# Patient Record
Sex: MALE | Race: WHITE
[De-identification: names, ages, dates, MRNs, and addresses within clinical notes are randomized per-mention and may not be internally consistent; named-entity substitution may affect disease eponyms.]

---

## 2018-12-07 ENCOUNTER — HOSPITAL ENCOUNTER (EMERGENCY)
Dept: HOSPITAL 92 - ERS | Age: 38
Discharge: HOME | End: 2018-12-07
Payer: SELF-PAY

## 2018-12-07 DIAGNOSIS — K21.9: ICD-10-CM

## 2018-12-07 DIAGNOSIS — J02.9: Primary | ICD-10-CM

## 2018-12-07 PROCEDURE — 87430 STREP A AG IA: CPT

## 2018-12-07 PROCEDURE — 71046 X-RAY EXAM CHEST 2 VIEWS: CPT

## 2018-12-07 PROCEDURE — 87081 CULTURE SCREEN ONLY: CPT

## 2018-12-07 NOTE — RAD
TWO VIEW CHEST:

 

INDICATION: 

Pharyngitis, cough, pain.

 

FINDINGS: 

There is no evidence of lobar consolidation, effusion, or pneumothorax.  The cardiac silhouette is no
rmal in size.  Osseous structures are intact.

 

IMPRESSION: 

No focal consolidation.

 

POS: [facility]

## 2019-01-12 ENCOUNTER — HOSPITAL ENCOUNTER (EMERGENCY)
Dept: HOSPITAL 9 - MADERS | Age: 39
Discharge: HOME | End: 2019-01-12
Payer: SELF-PAY

## 2019-01-12 DIAGNOSIS — J06.9: Primary | ICD-10-CM

## 2019-01-12 DIAGNOSIS — J98.01: ICD-10-CM

## 2019-01-12 LAB — SP GR UR STRIP: 1.01 (ref 1–1.03)

## 2019-01-12 PROCEDURE — 71046 X-RAY EXAM CHEST 2 VIEWS: CPT

## 2019-01-12 PROCEDURE — 87804 INFLUENZA ASSAY W/OPTIC: CPT

## 2019-01-12 PROCEDURE — 81003 URINALYSIS AUTO W/O SCOPE: CPT

## 2019-01-12 NOTE — RAD
EXAM:

CHEST TWO VIEWS:

1/12/19

 

HISTORY: 

Cough.

 

COMPARISON:  

12/7/18.

 

FINDINGS:  

Heart size is normal. The lungs are clear. No pneumonia, edema, or pleural effusion or other acute pr
ocess.

 

IMPRESSION:  

No acute intrathoracic disease. Stable from prior study. No evidence of pneumonia. 

 

POS: [facility]

## 2019-10-04 ENCOUNTER — HOSPITAL ENCOUNTER (EMERGENCY)
Dept: HOSPITAL 9 - MADERS | Age: 39
Discharge: HOME | End: 2019-10-04
Payer: SELF-PAY

## 2019-10-04 DIAGNOSIS — S61.217A: Primary | ICD-10-CM

## 2019-10-04 DIAGNOSIS — F41.9: ICD-10-CM

## 2019-10-04 DIAGNOSIS — T15.02XA: ICD-10-CM
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
## 2019-10-04 DIAGNOSIS — K21.9: ICD-10-CM

## 2019-10-04 DIAGNOSIS — F17.210: ICD-10-CM

## 2019-10-04 DIAGNOSIS — Z23: ICD-10-CM

## 2019-10-04 DIAGNOSIS — Z79.899: ICD-10-CM

## 2019-10-04 PROCEDURE — 90715 TDAP VACCINE 7 YRS/> IM: CPT

## 2019-10-04 PROCEDURE — 90471 IMMUNIZATION ADMIN: CPT

## 2019-10-04 PROCEDURE — 96372 THER/PROPH/DIAG INJ SC/IM: CPT

## 2019-10-17 ENCOUNTER — HOSPITAL ENCOUNTER (EMERGENCY)
Dept: HOSPITAL 9 - MADERS | Age: 39
Discharge: HOME | End: 2019-10-17
Payer: SELF-PAY

## 2019-10-17 DIAGNOSIS — J18.9: Primary | ICD-10-CM

## 2019-10-17 DIAGNOSIS — F41.9: ICD-10-CM

## 2019-10-17 DIAGNOSIS — K21.9: ICD-10-CM

## 2019-10-17 DIAGNOSIS — F17.210: ICD-10-CM

## 2019-10-17 PROCEDURE — 87804 INFLUENZA ASSAY W/OPTIC: CPT

## 2019-10-17 PROCEDURE — 71046 X-RAY EXAM CHEST 2 VIEWS: CPT

## 2019-10-17 NOTE — RAD
CHEST TWO VIEWS:

 

HISTORY:

Cough.

 

COMPARISON:

01/12/2019

 

FINDINGS:

The lung fields are clear. The heart and mediastinum appear normal. The osseous structures are normal
.

 

IMPRESSION:

Negative chest.

 

POS: [facility]

## 2019-10-29 ENCOUNTER — HOSPITAL ENCOUNTER (EMERGENCY)
Dept: HOSPITAL 9 - MADERS | Age: 39
Discharge: HOME | End: 2019-10-29
Payer: SELF-PAY

## 2019-10-29 DIAGNOSIS — K21.9: ICD-10-CM

## 2019-10-29 DIAGNOSIS — Z79.899: ICD-10-CM

## 2019-10-29 DIAGNOSIS — F41.9: ICD-10-CM

## 2019-10-29 DIAGNOSIS — J18.9: Primary | ICD-10-CM

## 2019-10-29 DIAGNOSIS — F17.210: ICD-10-CM

## 2019-10-29 PROCEDURE — 87430 STREP A AG IA: CPT

## 2019-10-29 PROCEDURE — 87804 INFLUENZA ASSAY W/OPTIC: CPT

## 2019-10-29 PROCEDURE — 87081 CULTURE SCREEN ONLY: CPT

## 2019-10-29 PROCEDURE — 71046 X-RAY EXAM CHEST 2 VIEWS: CPT

## 2019-10-29 NOTE — RAD
XR Chest Pa   Lat STANDARD



History: Fever cough and chills



Comparison: Radiograph October 17, 2019



Findings: Lungs are clear. No pneumothorax or effusion. Cardiac silhouette and mediastinal contours a
re within normal limits. No acute osseous abnormality.



Impression: No acute intrathoracic abnormality.



Reported By: Pedro Logan 

Electronically Signed:  10/29/2019 12:51 PM

## 2021-01-14 ENCOUNTER — HOSPITAL ENCOUNTER (EMERGENCY)
Dept: HOSPITAL 9 - MADERS | Age: 41
Discharge: HOME | End: 2021-01-14
Payer: SELF-PAY

## 2021-01-14 DIAGNOSIS — K21.9: ICD-10-CM

## 2021-01-14 DIAGNOSIS — R10.9: ICD-10-CM

## 2021-01-14 DIAGNOSIS — F17.210: ICD-10-CM

## 2021-01-14 DIAGNOSIS — Z79.899: ICD-10-CM

## 2021-01-14 DIAGNOSIS — R51.9: ICD-10-CM

## 2021-01-14 DIAGNOSIS — R53.83: Primary | ICD-10-CM

## 2021-01-14 DIAGNOSIS — Z87.442: ICD-10-CM

## 2021-01-14 DIAGNOSIS — Z85.841: ICD-10-CM

## 2021-01-14 DIAGNOSIS — R07.9: ICD-10-CM

## 2021-01-14 DIAGNOSIS — Z20.822: ICD-10-CM

## 2021-01-14 LAB
ALBUMIN SERPL BCG-MCNC: 3.9 G/DL (ref 3.5–5)
ALP SERPL-CCNC: 42 U/L (ref 40–110)
ALT SERPL W P-5'-P-CCNC: 16 U/L (ref 8–55)
ANION GAP SERPL CALC-SCNC: 13 MMOL/L (ref 10–20)
AST SERPL-CCNC: 16 U/L (ref 5–34)
BASOPHILS # BLD AUTO: 0.1 THOU/UL (ref 0–0.2)
BASOPHILS NFR BLD AUTO: 1.2 % (ref 0–1)
BILIRUB SERPL-MCNC: 0.3 MG/DL (ref 0.2–1.2)
BUN SERPL-MCNC: 8 MG/DL (ref 8.9–20.6)
CALCIUM SERPL-MCNC: 8.9 MG/DL (ref 7.8–10.44)
CHLORIDE SERPL-SCNC: 103 MMOL/L (ref 98–107)
CO2 SERPL-SCNC: 28 MMOL/L (ref 22–29)
CREAT CL PREDICTED SERPL C-G-VRATE: 0 ML/MIN (ref 70–130)
EOSINOPHIL # BLD AUTO: 0.1 THOU/UL (ref 0–0.7)
EOSINOPHIL NFR BLD AUTO: 1.9 % (ref 0–10)
GLOBULIN SER CALC-MCNC: 2.5 G/DL (ref 2.4–3.5)
GLUCOSE SERPL-MCNC: 95 MG/DL (ref 70–105)
HGB BLD-MCNC: 13.3 G/DL (ref 14–18)
LYMPHOCYTES # BLD AUTO: 2 THOU/UL (ref 1.2–3.4)
LYMPHOCYTES NFR BLD AUTO: 27.5 % (ref 21–51)
MCH RBC QN AUTO: 24.9 PG (ref 27–31)
MCV RBC AUTO: 79.1 FL (ref 78–98)
MONOCYTES # BLD AUTO: 0.5 THOU/UL (ref 0.11–0.59)
MONOCYTES NFR BLD AUTO: 6.6 % (ref 0–10)
NEUTROPHILS # BLD AUTO: 4.5 THOU/UL (ref 1.4–6.5)
NEUTROPHILS NFR BLD AUTO: 62.9 % (ref 42–75)
PLATELET # BLD AUTO: 223 THOU/UL (ref 130–400)
POTASSIUM SERPL-SCNC: 4.8 MMOL/L (ref 3.5–5.1)
RBC # BLD AUTO: 5.35 MILL/UL (ref 4.7–6.1)
SODIUM SERPL-SCNC: 139 MMOL/L (ref 136–145)
WBC # BLD AUTO: 7.2 THOU/UL (ref 4.8–10.8)

## 2021-01-14 PROCEDURE — 71045 X-RAY EXAM CHEST 1 VIEW: CPT

## 2021-01-14 PROCEDURE — 36415 COLL VENOUS BLD VENIPUNCTURE: CPT

## 2021-01-14 PROCEDURE — 85025 COMPLETE CBC W/AUTO DIFF WBC: CPT

## 2021-01-14 PROCEDURE — 87635 SARS-COV-2 COVID-19 AMP PRB: CPT

## 2021-01-14 PROCEDURE — 84443 ASSAY THYROID STIM HORMONE: CPT

## 2021-01-14 PROCEDURE — 84484 ASSAY OF TROPONIN QUANT: CPT

## 2021-01-14 PROCEDURE — 93005 ELECTROCARDIOGRAM TRACING: CPT

## 2021-01-14 PROCEDURE — 86140 C-REACTIVE PROTEIN: CPT

## 2021-01-14 PROCEDURE — U0005 INFEC AGEN DETEC AMPLI PROBE: HCPCS

## 2021-01-14 PROCEDURE — 80053 COMPREHEN METABOLIC PANEL: CPT

## 2021-01-14 PROCEDURE — U0003 INFECTIOUS AGENT DETECTION BY NUCLEIC ACID (DNA OR RNA); SEVERE ACUTE RESPIRATORY SYNDROME CORONAVIRUS 2 (SARS-COV-2) (CORONAVIRUS DISEASE [COVID-19]), AMPLIFIED PROBE TECHNIQUE, MAKING USE OF HIGH THROUGHPUT TECHNOLOGIES AS DESCRIBED BY CMS-2020-01-R: HCPCS

## 2021-01-14 NOTE — RAD
Portable frontal chest radiograph:

1/14/2021



COMPARISON: 10/29/2019



HISTORY: Fever, cough, chills



FINDINGS: Lungs are clear. Heart and mediastinal contours appear within normal limits.



IMPRESSION: No acute findings.



Reported By: Paul Curry 

Electronically Signed:  1/14/2021 5:20 PM

## 2021-02-06 ENCOUNTER — HOSPITAL ENCOUNTER (EMERGENCY)
Dept: HOSPITAL 9 - MADERS | Age: 41
Discharge: HOME | End: 2021-02-06
Payer: COMMERCIAL

## 2021-02-06 DIAGNOSIS — M79.10: ICD-10-CM

## 2021-02-06 DIAGNOSIS — K21.9: ICD-10-CM

## 2021-02-06 DIAGNOSIS — J01.90: Primary | ICD-10-CM

## 2021-02-06 DIAGNOSIS — Z20.828: ICD-10-CM

## 2021-02-06 DIAGNOSIS — Z79.899: ICD-10-CM

## 2021-02-06 DIAGNOSIS — F17.210: ICD-10-CM

## 2021-02-06 LAB
ALBUMIN SERPL BCG-MCNC: 3.7 G/DL (ref 3.5–5)
ALP SERPL-CCNC: 39 U/L (ref 40–110)
ALT SERPL W P-5'-P-CCNC: 11 U/L (ref 8–55)
ANION GAP SERPL CALC-SCNC: 11 MMOL/L (ref 10–20)
ANISOCYTOSIS BLD QL SMEAR: (no result) (100X)
AST SERPL-CCNC: 13 U/L (ref 5–34)
BASOPHILS # BLD AUTO: 0.1 THOU/UL (ref 0–0.2)
BASOPHILS NFR BLD AUTO: 0.9 % (ref 0–1)
BILIRUB SERPL-MCNC: 0.3 MG/DL (ref 0.2–1.2)
BUN SERPL-MCNC: 6 MG/DL (ref 8.9–20.6)
CALCIUM SERPL-MCNC: 8.2 MG/DL (ref 7.8–10.44)
CHLORIDE SERPL-SCNC: 107 MMOL/L (ref 98–107)
CO2 SERPL-SCNC: 25 MMOL/L (ref 22–29)
CREAT CL PREDICTED SERPL C-G-VRATE: 0 ML/MIN (ref 70–130)
EOSINOPHIL # BLD AUTO: 0.1 THOU/UL (ref 0–0.7)
EOSINOPHIL NFR BLD AUTO: 1.5 % (ref 0–10)
GLOBULIN SER CALC-MCNC: 2.3 G/DL (ref 2.4–3.5)
GLUCOSE SERPL-MCNC: 133 MG/DL (ref 70–105)
HGB BLD-MCNC: 13.7 G/DL (ref 14–18)
LYMPHOCYTES # BLD AUTO: 1.7 THOU/UL (ref 1.2–3.4)
LYMPHOCYTES NFR BLD AUTO: 22.8 % (ref 21–51)
MCH RBC QN AUTO: 24.8 PG (ref 27–31)
MCV RBC AUTO: 81.6 FL (ref 78–98)
MDIFF COMPLETE?: YES
MONOCYTES # BLD AUTO: 0.6 THOU/UL (ref 0.11–0.59)
MONOCYTES NFR BLD AUTO: 8.2 % (ref 0–10)
NEUTROPHILS # BLD AUTO: 5.1 THOU/UL (ref 1.4–6.5)
NEUTROPHILS NFR BLD AUTO: 66.6 % (ref 42–75)
PLATELET # BLD AUTO: 247 THOU/UL (ref 130–400)
POTASSIUM SERPL-SCNC: 3.6 MMOL/L (ref 3.5–5.1)
RBC # BLD AUTO: 5.53 MILL/UL (ref 4.7–6.1)
SODIUM SERPL-SCNC: 139 MMOL/L (ref 136–145)
SP GR UR STRIP: 1.01 (ref 1–1.03)
WBC # BLD AUTO: 7.6 THOU/UL (ref 4.8–10.8)

## 2021-02-06 PROCEDURE — 85025 COMPLETE CBC W/AUTO DIFF WBC: CPT

## 2021-02-06 PROCEDURE — U0005 INFEC AGEN DETEC AMPLI PROBE: HCPCS

## 2021-02-06 PROCEDURE — U0003 INFECTIOUS AGENT DETECTION BY NUCLEIC ACID (DNA OR RNA); SEVERE ACUTE RESPIRATORY SYNDROME CORONAVIRUS 2 (SARS-COV-2) (CORONAVIRUS DISEASE [COVID-19]), AMPLIFIED PROBE TECHNIQUE, MAKING USE OF HIGH THROUGHPUT TECHNOLOGIES AS DESCRIBED BY CMS-2020-01-R: HCPCS

## 2021-02-06 PROCEDURE — 80053 COMPREHEN METABOLIC PANEL: CPT

## 2021-02-06 PROCEDURE — 84443 ASSAY THYROID STIM HORMONE: CPT

## 2021-02-06 PROCEDURE — 87635 SARS-COV-2 COVID-19 AMP PRB: CPT

## 2021-02-06 PROCEDURE — 99283 EMERGENCY DEPT VISIT LOW MDM: CPT

## 2021-02-06 PROCEDURE — 87804 INFLUENZA ASSAY W/OPTIC: CPT

## 2021-02-06 PROCEDURE — 36415 COLL VENOUS BLD VENIPUNCTURE: CPT

## 2021-02-06 PROCEDURE — 81003 URINALYSIS AUTO W/O SCOPE: CPT

## 2021-03-06 ENCOUNTER — HOSPITAL ENCOUNTER (EMERGENCY)
Dept: HOSPITAL 9 - MADERS | Age: 41
Discharge: HOME | End: 2021-03-06
Payer: SELF-PAY

## 2021-03-06 DIAGNOSIS — J01.90: Primary | ICD-10-CM

## 2021-03-06 DIAGNOSIS — K21.9: ICD-10-CM

## 2021-03-06 DIAGNOSIS — Z85.841: ICD-10-CM

## 2021-03-06 DIAGNOSIS — F17.210: ICD-10-CM

## 2021-03-06 DIAGNOSIS — B96.89: ICD-10-CM

## 2021-03-06 DIAGNOSIS — Z79.899: ICD-10-CM

## 2021-03-06 DIAGNOSIS — Z87.442: ICD-10-CM

## 2021-03-06 PROCEDURE — 99283 EMERGENCY DEPT VISIT LOW MDM: CPT

## 2021-05-09 ENCOUNTER — HOSPITAL ENCOUNTER (EMERGENCY)
Dept: HOSPITAL 9 - MADERS | Age: 41
Discharge: HOME | End: 2021-05-09
Payer: SELF-PAY

## 2021-05-09 DIAGNOSIS — Z79.899: ICD-10-CM

## 2021-05-09 DIAGNOSIS — Z87.442: ICD-10-CM

## 2021-05-09 DIAGNOSIS — F17.210: ICD-10-CM
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
## 2021-05-09 DIAGNOSIS — K21.9: ICD-10-CM

## 2021-05-09 DIAGNOSIS — S05.01XA: Primary | ICD-10-CM

## 2021-05-09 PROCEDURE — 99283 EMERGENCY DEPT VISIT LOW MDM: CPT

## 2021-05-15 ENCOUNTER — HOSPITAL ENCOUNTER (EMERGENCY)
Dept: HOSPITAL 9 - MADERS | Age: 41
Discharge: HOME | End: 2021-05-15
Payer: SELF-PAY

## 2021-05-15 DIAGNOSIS — Z87.442: ICD-10-CM
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
## 2021-05-15 DIAGNOSIS — F17.210: ICD-10-CM

## 2021-05-15 DIAGNOSIS — K21.9: ICD-10-CM

## 2021-05-15 DIAGNOSIS — J01.00: ICD-10-CM

## 2021-05-15 DIAGNOSIS — S05.01XA: Primary | ICD-10-CM

## 2021-05-15 PROCEDURE — 99283 EMERGENCY DEPT VISIT LOW MDM: CPT

## 2021-09-03 ENCOUNTER — HOSPITAL ENCOUNTER (EMERGENCY)
Dept: HOSPITAL 9 - MADERS | Age: 41
Discharge: HOME | End: 2021-09-03
Payer: SELF-PAY

## 2021-09-03 DIAGNOSIS — Z20.822: ICD-10-CM

## 2021-09-03 DIAGNOSIS — F17.210: ICD-10-CM

## 2021-09-03 DIAGNOSIS — J02.9: Primary | ICD-10-CM

## 2021-09-03 DIAGNOSIS — K21.9: ICD-10-CM

## 2021-09-03 DIAGNOSIS — Z79.899: ICD-10-CM

## 2021-09-03 PROCEDURE — U0003 INFECTIOUS AGENT DETECTION BY NUCLEIC ACID (DNA OR RNA); SEVERE ACUTE RESPIRATORY SYNDROME CORONAVIRUS 2 (SARS-COV-2) (CORONAVIRUS DISEASE [COVID-19]), AMPLIFIED PROBE TECHNIQUE, MAKING USE OF HIGH THROUGHPUT TECHNOLOGIES AS DESCRIBED BY CMS-2020-01-R: HCPCS

## 2021-09-03 PROCEDURE — 99284 EMERGENCY DEPT VISIT MOD MDM: CPT

## 2021-09-03 PROCEDURE — U0005 INFEC AGEN DETEC AMPLI PROBE: HCPCS

## 2021-10-11 ENCOUNTER — HOSPITAL ENCOUNTER (EMERGENCY)
Dept: HOSPITAL 9 - MADERS | Age: 41
Discharge: HOME | End: 2021-10-11
Payer: SELF-PAY

## 2021-10-11 DIAGNOSIS — S13.4XXA: ICD-10-CM

## 2021-10-11 DIAGNOSIS — F07.81: ICD-10-CM

## 2021-10-11 DIAGNOSIS — G44.309: Primary | ICD-10-CM

## 2021-10-11 DIAGNOSIS — F17.210: ICD-10-CM
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
## 2021-10-11 PROCEDURE — 72125 CT NECK SPINE W/O DYE: CPT

## 2021-10-11 PROCEDURE — 70450 CT HEAD/BRAIN W/O DYE: CPT

## 2022-02-17 ENCOUNTER — HOSPITAL ENCOUNTER (EMERGENCY)
Dept: HOSPITAL 9 - MADERS | Age: 42
Discharge: HOME | End: 2022-02-17
Payer: SELF-PAY

## 2022-02-17 DIAGNOSIS — Z79.899: ICD-10-CM

## 2022-02-17 DIAGNOSIS — F17.210: ICD-10-CM

## 2022-02-17 DIAGNOSIS — J01.90: Primary | ICD-10-CM

## 2022-02-17 DIAGNOSIS — Z20.822: ICD-10-CM

## 2022-02-17 DIAGNOSIS — K21.9: ICD-10-CM

## 2022-02-17 DIAGNOSIS — B96.89: ICD-10-CM

## 2022-02-17 PROCEDURE — U0003 INFECTIOUS AGENT DETECTION BY NUCLEIC ACID (DNA OR RNA); SEVERE ACUTE RESPIRATORY SYNDROME CORONAVIRUS 2 (SARS-COV-2) (CORONAVIRUS DISEASE [COVID-19]), AMPLIFIED PROBE TECHNIQUE, MAKING USE OF HIGH THROUGHPUT TECHNOLOGIES AS DESCRIBED BY CMS-2020-01-R: HCPCS

## 2022-02-17 PROCEDURE — 87804 INFLUENZA ASSAY W/OPTIC: CPT

## 2022-02-17 PROCEDURE — 71046 X-RAY EXAM CHEST 2 VIEWS: CPT

## 2022-02-17 PROCEDURE — U0005 INFEC AGEN DETEC AMPLI PROBE: HCPCS

## 2022-07-16 ENCOUNTER — HOSPITAL ENCOUNTER (EMERGENCY)
Dept: HOSPITAL 9 - MADERS | Age: 42
Discharge: HOME | End: 2022-07-16
Payer: COMMERCIAL

## 2022-07-16 DIAGNOSIS — K21.9: ICD-10-CM

## 2022-07-16 DIAGNOSIS — F17.210: ICD-10-CM
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
## 2022-07-16 DIAGNOSIS — Z87.442: ICD-10-CM

## 2022-07-16 DIAGNOSIS — S60.121A: Primary | ICD-10-CM

## 2022-07-16 PROCEDURE — 11740 EVACUATION SUBUNGUAL HMTMA: CPT
